# Patient Record
Sex: MALE | Race: WHITE | NOT HISPANIC OR LATINO
[De-identification: names, ages, dates, MRNs, and addresses within clinical notes are randomized per-mention and may not be internally consistent; named-entity substitution may affect disease eponyms.]

---

## 2019-03-15 ENCOUNTER — APPOINTMENT (OUTPATIENT)
Dept: OTOLARYNGOLOGY | Facility: CLINIC | Age: 79
End: 2019-03-15
Payer: MEDICARE

## 2019-03-15 DIAGNOSIS — Z46.1 ENCOUNTER FOR FITTING AND ADJUSTMENT OF HEARING AID: ICD-10-CM

## 2019-03-15 PROBLEM — Z00.00 ENCOUNTER FOR PREVENTIVE HEALTH EXAMINATION: Status: ACTIVE | Noted: 2019-03-15

## 2019-03-15 PROCEDURE — V5299A: CUSTOM | Mod: NC

## 2019-06-03 ENCOUNTER — APPOINTMENT (OUTPATIENT)
Dept: OTOLARYNGOLOGY | Facility: CLINIC | Age: 79
End: 2019-06-03
Payer: MEDICARE

## 2019-06-03 VITALS
BODY MASS INDEX: 27.5 KG/M2 | DIASTOLIC BLOOD PRESSURE: 82 MMHG | WEIGHT: 203 LBS | HEIGHT: 72 IN | SYSTOLIC BLOOD PRESSURE: 156 MMHG | HEART RATE: 76 BPM

## 2019-06-03 DIAGNOSIS — H35.3290 EXUDATIVE AGE-RELATED MACULAR DEGENERATION, UNSPECIFIED EYE, STAGE UNSPECIFIED: ICD-10-CM

## 2019-06-03 DIAGNOSIS — Z80.9 FAMILY HISTORY OF MALIGNANT NEOPLASM, UNSPECIFIED: ICD-10-CM

## 2019-06-03 DIAGNOSIS — Z87.891 PERSONAL HISTORY OF NICOTINE DEPENDENCE: ICD-10-CM

## 2019-06-03 DIAGNOSIS — Z90.49 ACQUIRED ABSENCE OF OTHER SPECIFIED PARTS OF DIGESTIVE TRACT: ICD-10-CM

## 2019-06-03 DIAGNOSIS — Z86.39 PERSONAL HISTORY OF OTHER ENDOCRINE, NUTRITIONAL AND METABOLIC DISEASE: ICD-10-CM

## 2019-06-03 DIAGNOSIS — Z86.19 PERSONAL HISTORY OF OTHER INFECTIOUS AND PARASITIC DISEASES: ICD-10-CM

## 2019-06-03 DIAGNOSIS — K46.9 UNSPECIFIED ABDOMINAL HERNIA W/OUT OBSTRUCTION OR GANGRENE: ICD-10-CM

## 2019-06-03 DIAGNOSIS — Z86.79 PERSONAL HISTORY OF OTHER DISEASES OF THE CIRCULATORY SYSTEM: ICD-10-CM

## 2019-06-03 DIAGNOSIS — Z78.9 OTHER SPECIFIED HEALTH STATUS: ICD-10-CM

## 2019-06-03 DIAGNOSIS — Z87.39 PERSONAL HISTORY OF OTHER DISEASES OF THE MUSCULOSKELETAL SYSTEM AND CONNECTIVE TISSUE: ICD-10-CM

## 2019-06-03 PROCEDURE — V5299A: CUSTOM | Mod: NC

## 2019-06-03 PROCEDURE — V5275E: CUSTOM | Mod: NC

## 2019-06-03 RX ORDER — HYDROCHLOROTHIAZIDE 12.5 MG/1
12.5 CAPSULE ORAL
Qty: 90 | Refills: 0 | Status: ACTIVE | COMMUNITY
Start: 2018-09-10

## 2019-06-03 RX ORDER — SIMVASTATIN 40 MG/1
40 TABLET, FILM COATED ORAL
Qty: 90 | Refills: 0 | Status: ACTIVE | COMMUNITY
Start: 2018-09-10

## 2019-06-03 RX ORDER — FINASTERIDE 5 MG/1
TABLET, FILM COATED ORAL
Refills: 0 | Status: ACTIVE | COMMUNITY

## 2019-06-03 RX ORDER — ASPIRIN 81 MG
81 TABLET, DELAYED RELEASE (ENTERIC COATED) ORAL
Refills: 0 | Status: ACTIVE | COMMUNITY

## 2019-06-03 RX ORDER — TAMSULOSIN HYDROCHLORIDE 0.4 MG/1
CAPSULE ORAL
Refills: 0 | Status: ACTIVE | COMMUNITY

## 2019-06-03 RX ORDER — ASCORBIC ACID 500 MG
TABLET ORAL
Refills: 0 | Status: ACTIVE | COMMUNITY

## 2019-06-03 NOTE — PROCEDURE
[FreeTextEntry3] : Procedure: Earlens removal with replacement\par Under microscopic guidance the ear canal was cleaned of ceruminous debris. The earlens was grasped using a right angle hook on the grasping tab. It was gently removed.\par \par The ear canal was now cleaned including the anterior sulcus and the tympanic membrane surface of keratin and cerumen debris using the specialized applicator cotton swab dipped in mineral oil. When the ear canal and tympanic membrane surface was free of debris, a\par Lens was placed.\par \par The earlens was soaked in oil. It was grasped using the grasping And gently placed into the deep portion of the ear canal. It was then adjusted into proper position using a curved pick. The lens achieved a stable position on the tympanic membrane. Reported loud perception a sound after the use of the light pen.\par \par This procedure was performed identically on both sides.

## 2019-06-17 ENCOUNTER — APPOINTMENT (OUTPATIENT)
Dept: OTOLARYNGOLOGY | Facility: CLINIC | Age: 79
End: 2019-06-17
Payer: SELF-PAY

## 2019-06-17 PROCEDURE — 92593: CPT | Mod: NC

## 2019-06-24 ENCOUNTER — TRANSCRIPTION ENCOUNTER (OUTPATIENT)
Age: 79
End: 2019-06-24

## 2019-06-26 ENCOUNTER — APPOINTMENT (OUTPATIENT)
Dept: OTOLARYNGOLOGY | Facility: CLINIC | Age: 79
End: 2019-06-26
Payer: SELF-PAY

## 2019-06-26 PROCEDURE — V5275E: CUSTOM | Mod: NC

## 2019-06-26 PROCEDURE — 92593: CPT | Mod: NC

## 2019-06-26 NOTE — PHYSICAL EXAM
[FreeTextEntry1] : Procedure: Microscopic Ear Exam\par \par Left ear:  \par Ear canal patent. Tympanic membrane is intact but partially viewed. An earlens is in proper position. No crusting or inflammation.\par \par \par Right ear:  \par \par Ear canal patent. Tympanic membrane is intact but partially viewed. An earlens is in proper position. No crusting or inflammation.

## 2019-09-03 ENCOUNTER — APPOINTMENT (OUTPATIENT)
Dept: OTOLARYNGOLOGY | Facility: CLINIC | Age: 79
End: 2019-09-03
Payer: MEDICARE

## 2019-09-03 PROCEDURE — 92593: CPT | Mod: NC

## 2020-01-27 ENCOUNTER — APPOINTMENT (OUTPATIENT)
Dept: OTOLARYNGOLOGY | Facility: CLINIC | Age: 80
End: 2020-01-27
Payer: MEDICARE

## 2020-01-27 VITALS
HEIGHT: 72 IN | DIASTOLIC BLOOD PRESSURE: 72 MMHG | BODY MASS INDEX: 27.5 KG/M2 | SYSTOLIC BLOOD PRESSURE: 143 MMHG | WEIGHT: 203 LBS | HEART RATE: 65 BPM

## 2020-01-27 PROCEDURE — 92504 EAR MICROSCOPY EXAMINATION: CPT

## 2020-01-27 PROCEDURE — 99213 OFFICE O/P EST LOW 20 MIN: CPT | Mod: 25

## 2020-01-27 PROCEDURE — 92593: CPT | Mod: NC

## 2020-01-27 NOTE — CONSULT LETTER
[Please see my note below.] : Please see my note below. [FreeTextEntry2] : Dear CATHLEEN TARANGO  [FreeTextEntry1] : Thank you for allowing me to participate in the care of NICOLE CHAVEZ .\par Please see the attached visit note.\par \par \par \par Marvin Garcia\par Otology\par Department of Otolaryngology\par [facility]

## 2020-01-27 NOTE — HISTORY OF PRESENT ILLNESS
[de-identified] : NICOLE CHAVEZ has a history of Bilateral sensorineural hearing loss. He underwent earlens placement bilaterally in 2018. [FreeTextEntry1] : 01/27/2020 \par Routine followup for hearing loss. No perceived change in hearing loss. No ear pain or otorrhea. Continues to use earlens successfully. Using oil daily.\par \par

## 2020-01-27 NOTE — PHYSICAL EXAM
[FreeTextEntry1] : Procedure: Microscopic Ear Exam\par \par Left ear:  \par Ear canal patent. No inflammation or obstructing cerumen. The earlens is in good position. The visible portions of the tympanic membrane are within normal limits.\par \par \par Right ear:  \par Ear canal patent. No inflammation or obstructing cerumen. The earlens is in good position. The visible portions of the tympanic membrane are within normal limits.\par  [Normal] : mucosa is normal [Midline] : trachea located in midline position

## 2020-01-27 NOTE — ASSESSMENT
[FreeTextEntry1] : Stable hearing loss by history. Continues to succeed with the use of earlens devices bilaterally. I have recommended followup with his audiologist for device check. I've recommended followup with me in 6 months. Audiometry to be repeated.

## 2020-06-23 ENCOUNTER — APPOINTMENT (OUTPATIENT)
Dept: OTOLARYNGOLOGY | Facility: CLINIC | Age: 80
End: 2020-06-23

## 2020-07-20 ENCOUNTER — APPOINTMENT (OUTPATIENT)
Dept: OTOLARYNGOLOGY | Facility: CLINIC | Age: 80
End: 2020-07-20
Payer: MEDICARE

## 2020-07-20 DIAGNOSIS — H61.23 IMPACTED CERUMEN, BILATERAL: ICD-10-CM

## 2020-07-20 PROCEDURE — 92593: CPT | Mod: NC

## 2020-07-20 PROCEDURE — 69210 REMOVE IMPACTED EAR WAX UNI: CPT

## 2020-07-20 PROCEDURE — 92557 COMPREHENSIVE HEARING TEST: CPT

## 2020-07-20 PROCEDURE — 99213 OFFICE O/P EST LOW 20 MIN: CPT | Mod: 25

## 2020-07-20 PROCEDURE — 92567 TYMPANOMETRY: CPT

## 2020-07-20 NOTE — ASSESSMENT
[FreeTextEntry1] : Bilateral hearing loss with possible diminished word recognition bilaterally. I have discussed this with the patient in detail. I have recommended continued monitoring with audiology for amplification optimization. Ear hygiene reviewed.\par Followup advised.

## 2020-07-20 NOTE — HISTORY OF PRESENT ILLNESS
[de-identified] : NICOLE CHAVEZ has a history of Bilateral sensorineural hearing loss. He underwent earlens placement bilaterally in 2018. [FreeTextEntry1] : 07/20/2020 \par Routine followup for hearing loss. Possible perceived change in hearing loss for understanding speech. No ear pain or otorrhea. Continues to use earlens successfully. Using oil daily.\par \par

## 2020-07-20 NOTE — PHYSICAL EXAM
[Normal] : no rashes [FreeTextEntry1] : Microscopic ear exam with cerumen debridement:\par \par Right ear: Obstructing cerumen was debrided from the ear canal using suction, and curet.  The ear canal was otherwise within normal limits.  The earlens device is in good position. The visible portions of  The tympanic membrane was intact and noninflamed.\par \par Left ear: Obstructing cerumen was debrided from the ear canal using suction, and curets.  The ear canal was otherwise within normal limits.  Earlens device in good position. The visible portions of the  tympanic membrane was intact and noninflamed.\par

## 2020-07-20 NOTE — DATA REVIEWED
[de-identified] : Complete audiometry was ordered and completed today. This was separately reported by the audiologist. The results were reviewed in detail with the patient.\par \par

## 2020-07-20 NOTE — CONSULT LETTER
[Please see my note below.] : Please see my note below. [FreeTextEntry1] : Thank you for allowing me to participate in the care of NICOLE CHAVEZ .\par Please see the attached visit note.\par \par \par \par Marvin Garcia\par Otology\par Department of Otolaryngology\par [facility]  [FreeTextEntry2] : Dear CATHLEEN TARANGO

## 2020-10-15 ENCOUNTER — APPOINTMENT (OUTPATIENT)
Dept: OTOLARYNGOLOGY | Facility: CLINIC | Age: 80
End: 2020-10-15
Payer: SELF-PAY

## 2020-10-15 PROCEDURE — 92593: CPT | Mod: NC

## 2020-11-04 ENCOUNTER — APPOINTMENT (OUTPATIENT)
Dept: OTOLARYNGOLOGY | Facility: CLINIC | Age: 80
End: 2020-11-04
Payer: SELF-PAY

## 2020-11-04 ENCOUNTER — APPOINTMENT (OUTPATIENT)
Dept: OTOLARYNGOLOGY | Facility: CLINIC | Age: 80
End: 2020-11-04
Payer: MEDICARE

## 2020-11-04 DIAGNOSIS — H90.5 UNSPECIFIED SENSORINEURAL HEARING LOSS: ICD-10-CM

## 2020-11-04 PROCEDURE — V5275H: CUSTOM | Mod: 50

## 2020-11-04 PROCEDURE — 69210 REMOVE IMPACTED EAR WAX UNI: CPT

## 2020-11-04 PROCEDURE — 99213 OFFICE O/P EST LOW 20 MIN: CPT | Mod: 25

## 2020-11-04 PROCEDURE — 92593: CPT | Mod: NC

## 2020-11-04 NOTE — PHYSICAL EXAM
[Normal] : temporomandibular joint is normal [FreeTextEntry1] : Microscopic ear exam with cerumen debridement:\par \par Right ear: The ear canal was patent and nonobstructed.  The tympanic membrane was intact and noninflamed.Earlens in position on the tympanic membrane. The visible portion of the tympanic membrane was intact and noninflamed.\par \par \par Left ear: Obstructing cerumen was debrided from the ear canal using suction, and curet.  The ear canal was within normal limits.  Earlens in position on the tympanic membrane. The visible portion of the tympanic membrane was intact and noninflamed.

## 2020-11-04 NOTE — ASSESSMENT
[FreeTextEntry1] : Stable appearing ear bilaterally with earlens device in place. Routine followup for cerumen management and hearing loss surveillance.

## 2020-11-04 NOTE — HISTORY OF PRESENT ILLNESS
[de-identified] : NICOLE CHAVEZ has a history of Bilateral sensorineural hearing loss. He underwent earlens placement bilaterally in 2018. [FreeTextEntry1] : 11/04/2020 \par Routine followup for hearing loss. No perceived change in hearing loss. No ear pain or otorrhea. Continues to use earlens successfully. Using oil daily in the ears\par \par

## 2021-01-15 ENCOUNTER — NON-APPOINTMENT (OUTPATIENT)
Age: 81
End: 2021-01-15

## 2021-01-19 ENCOUNTER — APPOINTMENT (OUTPATIENT)
Dept: OTOLARYNGOLOGY | Facility: CLINIC | Age: 81
End: 2021-01-19
Payer: MEDICARE

## 2021-01-19 ENCOUNTER — APPOINTMENT (OUTPATIENT)
Dept: OTOLARYNGOLOGY | Facility: CLINIC | Age: 81
End: 2021-01-19
Payer: SELF-PAY

## 2021-01-19 VITALS — BODY MASS INDEX: 27.63 KG/M2 | TEMPERATURE: 95 F | WEIGHT: 204 LBS | HEIGHT: 72 IN

## 2021-01-19 PROCEDURE — 99213 OFFICE O/P EST LOW 20 MIN: CPT

## 2021-01-19 PROCEDURE — 92593: CPT | Mod: NC

## 2021-01-19 NOTE — PHYSICAL EXAM
[Normal] : temporomandibular joint is normal [FreeTextEntry1] : Procedure: Microscopic Ear Exam\par \par Left ear:  \par Ear canal intact without inflammation or lesion.  \par Tympanic membrane intact without inflammation.\par \par \par Right ear:  \par Ear canal intact without inflammation or lesion.  \par Earlens device displaced. This was gently repositioned without difficulty. No canal inflammation.

## 2021-01-19 NOTE — ASSESSMENT
[FreeTextEntry1] : Followup with audiology for device adjustments.\par \par I have recommended followup in one to 2 months to assess lens position. Replacement of this lens may be necessary.

## 2021-01-19 NOTE — HISTORY OF PRESENT ILLNESS
[de-identified] : NICOLE CHAVEZ has a history of Bilateral sensorineural hearing loss. He underwent earlens placement bilaterally in 2018. [FreeTextEntry1] : 01/19/2021 \par mild pain noted in the right ear intermittently possibly related to using the earlens system.

## 2021-03-15 ENCOUNTER — APPOINTMENT (OUTPATIENT)
Dept: OTOLARYNGOLOGY | Facility: CLINIC | Age: 81
End: 2021-03-15
Payer: SELF-PAY

## 2021-03-15 PROCEDURE — 92593: CPT | Mod: NC

## 2021-03-30 ENCOUNTER — APPOINTMENT (OUTPATIENT)
Dept: OTOLARYNGOLOGY | Facility: CLINIC | Age: 81
End: 2021-03-30
Payer: SELF-PAY

## 2021-03-30 PROCEDURE — 92593: CPT | Mod: NC

## 2021-03-30 PROCEDURE — 99024 POSTOP FOLLOW-UP VISIT: CPT

## 2021-04-14 ENCOUNTER — APPOINTMENT (OUTPATIENT)
Dept: OTOLARYNGOLOGY | Facility: CLINIC | Age: 81
End: 2021-04-14
Payer: SELF-PAY

## 2021-04-14 PROCEDURE — 92593: CPT | Mod: NC

## 2021-04-14 PROCEDURE — V5275H: CUSTOM

## 2021-04-14 RX ORDER — MIRTAZAPINE 7.5 MG/1
TABLET, FILM COATED ORAL
Refills: 0 | Status: ACTIVE | COMMUNITY

## 2021-04-14 RX ORDER — AMLODIPINE BESYLATE 5 MG/1
TABLET ORAL
Refills: 0 | Status: ACTIVE | COMMUNITY

## 2021-08-10 ENCOUNTER — APPOINTMENT (OUTPATIENT)
Dept: OTOLARYNGOLOGY | Facility: CLINIC | Age: 81
End: 2021-08-10

## 2021-08-11 ENCOUNTER — APPOINTMENT (OUTPATIENT)
Dept: OTOLARYNGOLOGY | Facility: CLINIC | Age: 81
End: 2021-08-11
Payer: SELF-PAY

## 2021-08-11 PROCEDURE — 92593: CPT | Mod: NC

## 2021-09-13 ENCOUNTER — APPOINTMENT (OUTPATIENT)
Dept: OTOLARYNGOLOGY | Facility: CLINIC | Age: 81
End: 2021-09-13
Payer: SELF-PAY

## 2021-09-13 PROCEDURE — V5275E: CUSTOM | Mod: NC

## 2021-09-13 PROCEDURE — 92593: CPT | Mod: NC

## 2021-09-21 ENCOUNTER — NON-APPOINTMENT (OUTPATIENT)
Age: 81
End: 2021-09-21

## 2021-10-19 ENCOUNTER — APPOINTMENT (OUTPATIENT)
Dept: OTOLARYNGOLOGY | Facility: CLINIC | Age: 81
End: 2021-10-19

## 2021-12-07 ENCOUNTER — APPOINTMENT (OUTPATIENT)
Dept: OTOLARYNGOLOGY | Facility: CLINIC | Age: 81
End: 2021-12-07
Payer: SELF-PAY

## 2021-12-07 ENCOUNTER — APPOINTMENT (OUTPATIENT)
Dept: OTOLARYNGOLOGY | Facility: CLINIC | Age: 81
End: 2021-12-07
Payer: MEDICARE

## 2021-12-07 PROCEDURE — 92593: CPT | Mod: NC

## 2021-12-07 PROCEDURE — 99024 POSTOP FOLLOW-UP VISIT: CPT

## 2022-05-03 ENCOUNTER — APPOINTMENT (OUTPATIENT)
Dept: OTOLARYNGOLOGY | Facility: CLINIC | Age: 82
End: 2022-05-03
Payer: MEDICARE

## 2022-05-03 ENCOUNTER — APPOINTMENT (OUTPATIENT)
Dept: OTOLARYNGOLOGY | Facility: CLINIC | Age: 82
End: 2022-05-03
Payer: SELF-PAY

## 2022-05-03 DIAGNOSIS — H90.3 SENSORINEURAL HEARING LOSS, BILATERAL: ICD-10-CM

## 2022-05-03 DIAGNOSIS — H61.20 IMPACTED CERUMEN, UNSPECIFIED EAR: ICD-10-CM

## 2022-05-03 PROCEDURE — 92591 HEARING AID EXAMINATION & SELECTION BINAURAL: CPT

## 2022-05-03 PROCEDURE — 92567 TYMPANOMETRY: CPT

## 2022-05-03 PROCEDURE — 92557 COMPREHENSIVE HEARING TEST: CPT

## 2022-05-03 PROCEDURE — G0268 REMOVAL OF IMPACTED WAX MD: CPT

## 2022-05-03 PROCEDURE — 99213 OFFICE O/P EST LOW 20 MIN: CPT | Mod: 25

## 2022-05-03 NOTE — HISTORY OF PRESENT ILLNESS
[de-identified] : NICOLE CHAVEZ has a history of Bilateral sensorineural hearing loss. He underwent earlens placement bilaterally in 2018. [FreeTextEntry1] : 05/03/2022 \par feels that hearing has declined.  No ear pain or otorrhea. No vertigo.  The patient feels that he may want the Earlens out.

## 2022-05-03 NOTE — DATA REVIEWED
[de-identified] : After removal of lenses, in light of the patients current symptoms, Complete audiometry was ordered and completed today. I have interpreted these results and reviewed them in detail with the patient.\par \par

## 2022-05-03 NOTE — ASSESSMENT
[FreeTextEntry1] : I have referred the patient for audiological follow-up for device selection.  He may wish to consider air conduction hearing devices.\par \par We discussed this in detail.  Continued clinical monitoring for hearing loss and cerumen management recommended.

## 2022-05-03 NOTE — PHYSICAL EXAM
[Normal] : temporomandibular joint is normal [FreeTextEntry1] : Microscopic ear exam with cerumen debridement:\par \par Right ear: Extruded Earlens device removed with alligator forceps.  The ear canal was patent and nonobstructed.  The tympanic membrane was intact and noninflamed.\par \par Left ear: Obstructing cerumen was debrided from the ear canal using suction, and curet.  The ear canal was within normal limits.  Earlens device in place on the tympanic membrane removed with right angled hook and alligator forceps.  The tympanic membrane was intact and noninflamed.

## 2022-05-09 PROBLEM — H90.3 SENSORINEURAL HEARING LOSS (SNHL) OF BOTH EARS: Status: ACTIVE | Noted: 2019-06-26

## 2022-05-17 ENCOUNTER — APPOINTMENT (OUTPATIENT)
Dept: OTOLARYNGOLOGY | Facility: CLINIC | Age: 82
End: 2022-05-17
Payer: SELF-PAY

## 2022-05-17 PROCEDURE — V5261I: CUSTOM

## 2022-05-19 ENCOUNTER — APPOINTMENT (OUTPATIENT)
Dept: OTOLARYNGOLOGY | Facility: CLINIC | Age: 82
End: 2022-05-19
Payer: SELF-PAY

## 2022-05-19 PROCEDURE — 92593: CPT | Mod: NC

## 2022-06-09 ENCOUNTER — APPOINTMENT (OUTPATIENT)
Dept: OTOLARYNGOLOGY | Facility: CLINIC | Age: 82
End: 2022-06-09
Payer: SELF-PAY

## 2022-06-09 PROCEDURE — 92593: CPT | Mod: NC

## 2022-06-14 ENCOUNTER — APPOINTMENT (OUTPATIENT)
Dept: OTOLARYNGOLOGY | Facility: CLINIC | Age: 82
End: 2022-06-14